# Patient Record
(demographics unavailable — no encounter records)

---

## 2024-12-02 NOTE — ASSESSMENT
[FreeTextEntry1] : Reports continued improved hearing since last visit.  Exam today shows intact left tympanic membrane reconstruction, adhesion from superior aspect of canal to posterior canal superiorly, no concern for recurrent cholesteatoma, patent T-tube.  Right ear with stable atticotomy defect, patent T-tube inferiorly.  I reviewed a new audiogram today which shows mild to moderate conductive hearing loss bilaterally.  Follow-up March-April 2025, will obtain new diffusion-weighted MRI after that time.  Maintain dry ear precautions.  Would consider lysing epithelial adhesion involving superior aspect of left tympanic membrane if no plan for additional future surgery left ear.

## 2025-03-24 NOTE — PROCEDURE
[FreeTextEntry3] : Procedure note:  Bilateral cerumenectomy  Mar 24, 2025   Description of Procedure:   Bilateral cerumen impactions were noted requiring debridement under the operating microscope using otologic instrumentation.  The patient tolerated the procedure without complications.  [de-identified] : Procedure note: Nasal endoscopy   Mar 24, 2025   Description of Procedure:  Nasal endoscopy was performed because of recalcitrant symptoms of nasal obstruction and/or chronic rhinitis, and anterior anatomic abnormalities precluding visualization.  Using a flexible endoscope with sheath, the nasal mucosa, septum, turbinates, and ostiomeatal complex were examined.    Nasal mucosa findings:  the nasal mucosa was edematous. Septum findings:  the septum showed no abnormalities. Nasopharynx findings:  the nasopharynx was normal. Middle meatus findings:  the middle meatus had no abnormalities. Sinuses findings:  the paranasal sinuses had no abnormalities.

## 2025-03-24 NOTE — ASSESSMENT
[FreeTextEntry1] : Recent nasal congestion and URI symptoms, no subjective change in hearing, no pain or drainage.  Exam today shows intact left tympanic membrane reconstruction, stable scar band superiorly, no new cholesteatoma.  Bilateral T-tube patent, stable right tympanic membrane retraction.  Nasal endoscopy shows thin secretions bilaterally.  Check diffusion-weighted MRI sometime in the next few months, sooner no cholesteatoma follow-up 6 months.